# Patient Record
Sex: FEMALE | Race: WHITE | NOT HISPANIC OR LATINO
[De-identification: names, ages, dates, MRNs, and addresses within clinical notes are randomized per-mention and may not be internally consistent; named-entity substitution may affect disease eponyms.]

---

## 2021-03-27 ENCOUNTER — APPOINTMENT (OUTPATIENT)
Age: 36
End: 2021-03-27
Payer: COMMERCIAL

## 2021-03-27 PROCEDURE — 0001A: CPT

## 2021-04-15 PROBLEM — Z00.00 ENCOUNTER FOR PREVENTIVE HEALTH EXAMINATION: Status: ACTIVE | Noted: 2021-04-15

## 2021-04-19 ENCOUNTER — APPOINTMENT (OUTPATIENT)
Age: 36
End: 2021-04-19

## 2024-03-17 ENCOUNTER — TRANSCRIPTION ENCOUNTER (OUTPATIENT)
Age: 39
End: 2024-03-17

## 2024-03-18 ENCOUNTER — TRANSCRIPTION ENCOUNTER (OUTPATIENT)
Age: 39
End: 2024-03-18

## 2024-03-18 ENCOUNTER — EMERGENCY (EMERGENCY)
Facility: HOSPITAL | Age: 39
LOS: 1 days | Discharge: ANOTHER HOSPITAL | End: 2024-03-18
Attending: EMERGENCY MEDICINE | Admitting: EMERGENCY MEDICINE
Payer: COMMERCIAL

## 2024-03-18 ENCOUNTER — INPATIENT (INPATIENT)
Facility: HOSPITAL | Age: 39
LOS: 0 days | Discharge: HOME | DRG: 779 | End: 2024-03-18
Attending: PEDIATRICS | Admitting: PEDIATRICS
Payer: COMMERCIAL

## 2024-03-18 VITALS
OXYGEN SATURATION: 100 % | HEART RATE: 67 BPM | SYSTOLIC BLOOD PRESSURE: 89 MMHG | RESPIRATION RATE: 20 BRPM | DIASTOLIC BLOOD PRESSURE: 54 MMHG

## 2024-03-18 VITALS
HEART RATE: 64 BPM | SYSTOLIC BLOOD PRESSURE: 95 MMHG | RESPIRATION RATE: 16 BRPM | OXYGEN SATURATION: 99 % | DIASTOLIC BLOOD PRESSURE: 90 MMHG | TEMPERATURE: 96 F

## 2024-03-18 VITALS
TEMPERATURE: 97 F | HEIGHT: 69 IN | SYSTOLIC BLOOD PRESSURE: 90 MMHG | WEIGHT: 138.89 LBS | OXYGEN SATURATION: 99 % | RESPIRATION RATE: 16 BRPM | HEART RATE: 57 BPM | DIASTOLIC BLOOD PRESSURE: 57 MMHG

## 2024-03-18 VITALS
HEART RATE: 73 BPM | TEMPERATURE: 97 F | HEIGHT: 69 IN | DIASTOLIC BLOOD PRESSURE: 64 MMHG | SYSTOLIC BLOOD PRESSURE: 91 MMHG | WEIGHT: 139.99 LBS | OXYGEN SATURATION: 100 % | RESPIRATION RATE: 15 BRPM

## 2024-03-18 DIAGNOSIS — Z98.891 HISTORY OF UTERINE SCAR FROM PREVIOUS SURGERY: Chronic | ICD-10-CM

## 2024-03-18 LAB
ALBUMIN SERPL ELPH-MCNC: 3.2 G/DL — LOW (ref 3.4–5)
ALP SERPL-CCNC: 44 U/L — SIGNIFICANT CHANGE UP (ref 40–120)
ALT FLD-CCNC: 22 U/L — SIGNIFICANT CHANGE UP (ref 12–42)
ANION GAP SERPL CALC-SCNC: 10 MMOL/L — SIGNIFICANT CHANGE UP (ref 9–16)
APTT BLD: 25.2 SEC — SIGNIFICANT CHANGE UP (ref 24.5–35.6)
APTT BLD: 31.1 SEC — SIGNIFICANT CHANGE UP (ref 24.5–35.6)
AST SERPL-CCNC: 15 U/L — SIGNIFICANT CHANGE UP (ref 15–37)
BASOPHILS # BLD AUTO: 0.01 K/UL — SIGNIFICANT CHANGE UP (ref 0–0.2)
BASOPHILS # BLD AUTO: 0.02 K/UL — SIGNIFICANT CHANGE UP (ref 0–0.2)
BASOPHILS # BLD AUTO: 0.02 K/UL — SIGNIFICANT CHANGE UP (ref 0–0.2)
BASOPHILS NFR BLD AUTO: 0.2 % — SIGNIFICANT CHANGE UP (ref 0–2)
BASOPHILS NFR BLD AUTO: 0.3 % — SIGNIFICANT CHANGE UP (ref 0–2)
BASOPHILS NFR BLD AUTO: 0.3 % — SIGNIFICANT CHANGE UP (ref 0–2)
BILIRUB SERPL-MCNC: 0.1 MG/DL — LOW (ref 0.2–1.2)
BLD GP AB SCN SERPL QL: POSITIVE — SIGNIFICANT CHANGE UP
BUN SERPL-MCNC: 14 MG/DL — SIGNIFICANT CHANGE UP (ref 7–23)
CALCIUM SERPL-MCNC: 8.6 MG/DL — SIGNIFICANT CHANGE UP (ref 8.5–10.5)
CHLORIDE SERPL-SCNC: 106 MMOL/L — SIGNIFICANT CHANGE UP (ref 96–108)
CO2 SERPL-SCNC: 23 MMOL/L — SIGNIFICANT CHANGE UP (ref 22–31)
CREAT SERPL-MCNC: 0.64 MG/DL — SIGNIFICANT CHANGE UP (ref 0.5–1.3)
EGFR: 115 ML/MIN/1.73M2 — SIGNIFICANT CHANGE UP
EOSINOPHIL # BLD AUTO: 0.03 K/UL — SIGNIFICANT CHANGE UP (ref 0–0.5)
EOSINOPHIL # BLD AUTO: 0.05 K/UL — SIGNIFICANT CHANGE UP (ref 0–0.5)
EOSINOPHIL # BLD AUTO: 0.16 K/UL — SIGNIFICANT CHANGE UP (ref 0–0.5)
EOSINOPHIL NFR BLD AUTO: 0.6 % — SIGNIFICANT CHANGE UP (ref 0–6)
EOSINOPHIL NFR BLD AUTO: 0.6 % — SIGNIFICANT CHANGE UP (ref 0–6)
EOSINOPHIL NFR BLD AUTO: 2.8 % — SIGNIFICANT CHANGE UP (ref 0–6)
GLUCOSE BLDC GLUCOMTR-MCNC: 148 MG/DL — HIGH (ref 70–99)
GLUCOSE SERPL-MCNC: 112 MG/DL — HIGH (ref 70–99)
HCG SERPL-ACNC: 2703 MIU/ML — HIGH
HCT VFR BLD CALC: 25 % — LOW (ref 34.5–45)
HCT VFR BLD CALC: 28.8 % — LOW (ref 34.5–45)
HCT VFR BLD CALC: 33.8 % — LOW (ref 34.5–45)
HGB BLD-MCNC: 11.1 G/DL — LOW (ref 11.5–15.5)
HGB BLD-MCNC: 8.6 G/DL — LOW (ref 11.5–15.5)
HGB BLD-MCNC: 9.8 G/DL — LOW (ref 11.5–15.5)
IMM GRANULOCYTES NFR BLD AUTO: 0.3 % — SIGNIFICANT CHANGE UP (ref 0–0.9)
IMM GRANULOCYTES NFR BLD AUTO: 0.4 % — SIGNIFICANT CHANGE UP (ref 0–0.9)
IMM GRANULOCYTES NFR BLD AUTO: 0.7 % — SIGNIFICANT CHANGE UP (ref 0–0.9)
INR BLD: 1.17 — SIGNIFICANT CHANGE UP (ref 0.85–1.18)
INR BLD: 1.18 — SIGNIFICANT CHANGE UP (ref 0.85–1.18)
LYMPHOCYTES # BLD AUTO: 0.96 K/UL — LOW (ref 1–3.3)
LYMPHOCYTES # BLD AUTO: 1.15 K/UL — SIGNIFICANT CHANGE UP (ref 1–3.3)
LYMPHOCYTES # BLD AUTO: 1.39 K/UL — SIGNIFICANT CHANGE UP (ref 1–3.3)
LYMPHOCYTES # BLD AUTO: 12.3 % — LOW (ref 13–44)
LYMPHOCYTES # BLD AUTO: 21.2 % — SIGNIFICANT CHANGE UP (ref 13–44)
LYMPHOCYTES # BLD AUTO: 24.2 % — SIGNIFICANT CHANGE UP (ref 13–44)
MCHC RBC-ENTMCNC: 30.2 PG — SIGNIFICANT CHANGE UP (ref 27–34)
MCHC RBC-ENTMCNC: 31.1 PG — SIGNIFICANT CHANGE UP (ref 27–34)
MCHC RBC-ENTMCNC: 31.5 PG — SIGNIFICANT CHANGE UP (ref 27–34)
MCHC RBC-ENTMCNC: 32.8 GM/DL — SIGNIFICANT CHANGE UP (ref 32–36)
MCHC RBC-ENTMCNC: 34 GM/DL — SIGNIFICANT CHANGE UP (ref 32–36)
MCHC RBC-ENTMCNC: 34.4 GM/DL — SIGNIFICANT CHANGE UP (ref 32–36)
MCV RBC AUTO: 91.4 FL — SIGNIFICANT CHANGE UP (ref 80–100)
MCV RBC AUTO: 91.6 FL — SIGNIFICANT CHANGE UP (ref 80–100)
MCV RBC AUTO: 92.1 FL — SIGNIFICANT CHANGE UP (ref 80–100)
MONOCYTES # BLD AUTO: 0.21 K/UL — SIGNIFICANT CHANGE UP (ref 0–0.9)
MONOCYTES # BLD AUTO: 0.28 K/UL — SIGNIFICANT CHANGE UP (ref 0–0.9)
MONOCYTES # BLD AUTO: 0.3 K/UL — SIGNIFICANT CHANGE UP (ref 0–0.9)
MONOCYTES NFR BLD AUTO: 3.6 % — SIGNIFICANT CHANGE UP (ref 2–14)
MONOCYTES NFR BLD AUTO: 3.9 % — SIGNIFICANT CHANGE UP (ref 2–14)
MONOCYTES NFR BLD AUTO: 5.2 % — SIGNIFICANT CHANGE UP (ref 2–14)
NEUTROPHILS # BLD AUTO: 3.83 K/UL — SIGNIFICANT CHANGE UP (ref 1.8–7.4)
NEUTROPHILS # BLD AUTO: 4 K/UL — SIGNIFICANT CHANGE UP (ref 1.8–7.4)
NEUTROPHILS # BLD AUTO: 6.48 K/UL — SIGNIFICANT CHANGE UP (ref 1.8–7.4)
NEUTROPHILS NFR BLD AUTO: 66.8 % — SIGNIFICANT CHANGE UP (ref 43–77)
NEUTROPHILS NFR BLD AUTO: 73.7 % — SIGNIFICANT CHANGE UP (ref 43–77)
NEUTROPHILS NFR BLD AUTO: 82.9 % — HIGH (ref 43–77)
NRBC # BLD: 0 /100 WBCS — SIGNIFICANT CHANGE UP (ref 0–0)
PLATELET # BLD AUTO: 138 K/UL — LOW (ref 150–400)
PLATELET # BLD AUTO: 139 K/UL — LOW (ref 150–400)
PLATELET # BLD AUTO: 159 K/UL — SIGNIFICANT CHANGE UP (ref 150–400)
POTASSIUM SERPL-MCNC: 4.3 MMOL/L — SIGNIFICANT CHANGE UP (ref 3.5–5.3)
POTASSIUM SERPL-SCNC: 4.3 MMOL/L — SIGNIFICANT CHANGE UP (ref 3.5–5.3)
PROT SERPL-MCNC: 6.2 G/DL — LOW (ref 6.4–8.2)
PROTHROM AB SERPL-ACNC: 12.9 SEC — SIGNIFICANT CHANGE UP (ref 9.5–13)
PROTHROM AB SERPL-ACNC: 13.3 SEC — HIGH (ref 9.5–13)
RBC # BLD: 2.73 M/UL — LOW (ref 3.8–5.2)
RBC # BLD: 3.15 M/UL — LOW (ref 3.8–5.2)
RBC # BLD: 3.67 M/UL — LOW (ref 3.8–5.2)
RBC # FLD: 12.9 % — SIGNIFICANT CHANGE UP (ref 10.3–14.5)
RBC # FLD: 13.2 % — SIGNIFICANT CHANGE UP (ref 10.3–14.5)
RBC # FLD: 13.5 % — SIGNIFICANT CHANGE UP (ref 10.3–14.5)
RH IG SCN BLD-IMP: NEGATIVE — SIGNIFICANT CHANGE UP
RH IG SCN BLD-IMP: NEGATIVE — SIGNIFICANT CHANGE UP
SODIUM SERPL-SCNC: 139 MMOL/L — SIGNIFICANT CHANGE UP (ref 132–145)
TROPONIN I, HIGH SENSITIVITY RESULT: <4 NG/L — SIGNIFICANT CHANGE UP
WBC # BLD: 5.42 K/UL — SIGNIFICANT CHANGE UP (ref 3.8–10.5)
WBC # BLD: 5.74 K/UL — SIGNIFICANT CHANGE UP (ref 3.8–10.5)
WBC # BLD: 7.81 K/UL — SIGNIFICANT CHANGE UP (ref 3.8–10.5)
WBC # FLD AUTO: 5.42 K/UL — SIGNIFICANT CHANGE UP (ref 3.8–10.5)
WBC # FLD AUTO: 5.74 K/UL — SIGNIFICANT CHANGE UP (ref 3.8–10.5)
WBC # FLD AUTO: 7.81 K/UL — SIGNIFICANT CHANGE UP (ref 3.8–10.5)

## 2024-03-18 PROCEDURE — 99291 CRITICAL CARE FIRST HOUR: CPT

## 2024-03-18 PROCEDURE — 36430 TRANSFUSION BLD/BLD COMPNT: CPT

## 2024-03-18 PROCEDURE — 86850 RBC ANTIBODY SCREEN: CPT

## 2024-03-18 PROCEDURE — 85025 COMPLETE CBC W/AUTO DIFF WBC: CPT

## 2024-03-18 PROCEDURE — 86901 BLOOD TYPING SEROLOGIC RH(D): CPT

## 2024-03-18 PROCEDURE — 99285 EMERGENCY DEPT VISIT HI MDM: CPT

## 2024-03-18 PROCEDURE — 85730 THROMBOPLASTIN TIME PARTIAL: CPT

## 2024-03-18 PROCEDURE — 86920 COMPATIBILITY TEST SPIN: CPT

## 2024-03-18 PROCEDURE — 85610 PROTHROMBIN TIME: CPT

## 2024-03-18 PROCEDURE — 86870 RBC ANTIBODY IDENTIFICATION: CPT

## 2024-03-18 PROCEDURE — 88305 TISSUE EXAM BY PATHOLOGIST: CPT | Mod: 26

## 2024-03-18 PROCEDURE — 86922 COMPATIBILITY TEST ANTIGLOB: CPT

## 2024-03-18 PROCEDURE — 86880 COOMBS TEST DIRECT: CPT

## 2024-03-18 PROCEDURE — 86077 PHYS BLOOD BANK SERV XMATCH: CPT

## 2024-03-18 PROCEDURE — 86900 BLOOD TYPING SEROLOGIC ABO: CPT

## 2024-03-18 PROCEDURE — C9399: CPT

## 2024-03-18 PROCEDURE — P9016: CPT

## 2024-03-18 PROCEDURE — 36415 COLL VENOUS BLD VENIPUNCTURE: CPT

## 2024-03-18 PROCEDURE — 88305 TISSUE EXAM BY PATHOLOGIST: CPT

## 2024-03-18 RX ORDER — HYDROMORPHONE HYDROCHLORIDE 2 MG/ML
0.5 INJECTION INTRAMUSCULAR; INTRAVENOUS; SUBCUTANEOUS
Refills: 0 | Status: DISCONTINUED | OUTPATIENT
Start: 2024-03-18 | End: 2024-03-18

## 2024-03-18 RX ORDER — SODIUM CHLORIDE 9 MG/ML
1000 INJECTION, SOLUTION INTRAVENOUS
Refills: 0 | Status: DISCONTINUED | OUTPATIENT
Start: 2024-03-18 | End: 2024-03-18

## 2024-03-18 RX ORDER — METOCLOPRAMIDE HCL 10 MG
10 TABLET ORAL EVERY 6 HOURS
Refills: 0 | Status: DISCONTINUED | OUTPATIENT
Start: 2024-03-18 | End: 2024-03-18

## 2024-03-18 RX ORDER — SODIUM CHLORIDE 9 MG/ML
1000 INJECTION INTRAMUSCULAR; INTRAVENOUS; SUBCUTANEOUS ONCE
Refills: 0 | Status: COMPLETED | OUTPATIENT
Start: 2024-03-18 | End: 2024-03-18

## 2024-03-18 RX ORDER — ONDANSETRON 8 MG/1
8 TABLET, FILM COATED ORAL EVERY 6 HOURS
Refills: 0 | Status: DISCONTINUED | OUTPATIENT
Start: 2024-03-18 | End: 2024-03-18

## 2024-03-18 RX ORDER — KETOROLAC TROMETHAMINE 30 MG/ML
30 SYRINGE (ML) INJECTION EVERY 6 HOURS
Refills: 0 | Status: DISCONTINUED | OUTPATIENT
Start: 2024-03-18 | End: 2024-03-18

## 2024-03-18 RX ORDER — ACETAMINOPHEN 500 MG
1000 TABLET ORAL EVERY 6 HOURS
Refills: 0 | Status: DISCONTINUED | OUTPATIENT
Start: 2024-03-18 | End: 2024-03-18

## 2024-03-18 RX ORDER — SIMETHICONE 80 MG/1
80 TABLET, CHEWABLE ORAL EVERY 6 HOURS
Refills: 0 | Status: DISCONTINUED | OUTPATIENT
Start: 2024-03-18 | End: 2024-03-18

## 2024-03-18 RX ORDER — OXYCODONE HYDROCHLORIDE 5 MG/1
10 TABLET ORAL EVERY 4 HOURS
Refills: 0 | Status: DISCONTINUED | OUTPATIENT
Start: 2024-03-18 | End: 2024-03-18

## 2024-03-18 RX ORDER — OXYCODONE HYDROCHLORIDE 5 MG/1
5 TABLET ORAL EVERY 4 HOURS
Refills: 0 | Status: DISCONTINUED | OUTPATIENT
Start: 2024-03-18 | End: 2024-03-18

## 2024-03-18 RX ADMIN — SODIUM CHLORIDE 1000 MILLILITER(S): 9 INJECTION INTRAMUSCULAR; INTRAVENOUS; SUBCUTANEOUS at 11:05

## 2024-03-18 RX ADMIN — SODIUM CHLORIDE 1000 MILLILITER(S): 9 INJECTION INTRAMUSCULAR; INTRAVENOUS; SUBCUTANEOUS at 12:46

## 2024-03-18 RX ADMIN — SODIUM CHLORIDE 1000 MILLILITER(S): 9 INJECTION INTRAMUSCULAR; INTRAVENOUS; SUBCUTANEOUS at 12:34

## 2024-03-18 NOTE — DISCHARGE NOTE PROVIDER - CARE PROVIDERS DIRECT ADDRESSES
[email or web address].Counts include 234 beds at the [facility]

## 2024-03-18 NOTE — ASU DISCHARGE PLAN (ADULT/PEDIATRIC) - CARE PROVIDER_API CALL
Ashwin Lynn  Obstetrics and Gynecology  [address] [address]  Phone: [phone]  Fax: [phone]  Follow Up Time:

## 2024-03-18 NOTE — ED ADULT NURSE NOTE - NSFALLUNIVINTERV_ED_ALL_ED
Bed/Stretcher in lowest position, wheels locked, appropriate side rails in place/Call bell, personal items and telephone in reach/Instruct patient to call for assistance before getting out of bed/chair/stretcher/Non-slip footwear applied when patient is off stretcher/[place] to call system/Physically safe environment - no spills, clutter or unnecessary equipment/Purposeful proactive rounding/Room/bathroom lighting operational, light cord in reach

## 2024-03-18 NOTE — ED ADULT NURSE NOTE - OBJECTIVE STATEMENT
Pt is a 40 yo F bibems transferred from [facility] for vaginal bleeding after taking misoprostol at approx 930pm last night for nonviable IUP (approx ?8 weeks). Pt reports bleeding starting around 5am, now soaking thru a pad every 5 min. Given one unit prbcs at [facility], unit completed upon arrival here. Pt upgraded to MD Penny, with OBNORTH PA both at bedside upon room assignment.  On arrival, BP 80-90s systolic; states her bp is usually low. PIVs noted to bilateral ACs with 1L NS infusing; additional bloodwork sent to lab. Pt transferred to room L for pelvic exam with OB attending who recommends transfer to OR.   Pt able to ambulate with standby assist from stretcher to bed but c/o developing nausea and lightheadedness.   Pt back in stretcher without issue, on continuous cardiac/O2/BP monitoring.   RN report given to OR; pt transported by OB PA x2 and attending off unit without issue. Pt's  at bedside with all of pt's belongings.

## 2024-03-18 NOTE — ED ADULT NURSE NOTE - CHIEF COMPLAINT QUOTE
Pt, (G4,P2) presents to ER as a transfer from [place] for further evaluation of vaginal bleeding since this morning s/p taking misoprostol yesterday.

## 2024-03-18 NOTE — BRIEF OPERATIVE NOTE - NSICDXBRIEFPROCEDURE_GEN_ALL_CORE_FT
PROCEDURES:  Suction curettage or dilation and curettage, uterus, for termination of pregnancy 18-Mar-2024 15:23:06  Simran Lee

## 2024-03-18 NOTE — ED PROVIDER NOTE - ATTENDING APP SHARED VISIT CONTRIBUTION OF CARE
Patient with heavy vaginal bleeding after taking misoprostol. Had a syncopal episode at home and again in the ED. No injury in ED.    Gen: NAD. HEENT: NCAT, mmm   Chest: RRR, nl S1 and S2, no m/r/g. Resp: CTAB, no w/r/r  Abd: nl BS, soft, nt/nd. : active vaginal bleeding. Ext: Warm, dry  Neuro: CN II-XII intact, normal and equal strength, sensation, and reflexes bilaterally, speech clear.  Psych: AAOx3     MDM: Patient urgently transferred to [facility] for heavy vaginal bleeding.    Critical Care time: 60 minutes

## 2024-03-18 NOTE — BRIEF OPERATIVE NOTE - OPERATION/FINDINGS
cervix grasped with single tooth tenaculum. 8mm curette inserted and serially rotated until contents of uterus fully cleared. Banjo curette used. EBL 50. 200cc clot passed prior to starting procedure.

## 2024-03-18 NOTE — ED PROVIDER NOTE - PROGRESS NOTE DETAILS
Pt had another syncopal episode in the bathroom  Still going through 2 pads every 30 min  Spoke with Dr. Hernandez OBGYN, who accepts pt as and ED to ED transfer. Pt will require one unit of blood prior to transfer  Spoke with Dr. Singer, ED attending, accepts pt to the ED.

## 2024-03-18 NOTE — DISCHARGE NOTE PROVIDER - NSDCFUADDINST_GEN_ALL_CORE_FT
Pelvic rest (nothing in vagina) x6 weeks or unless otherwise instructed by physician. Schedule follow up appointment with Dr. Lynn or outside OBGYN        in 1-2 weeks. Go to nearest ED if fever >100.4 F, severe abdominal pain or heavy vaginal bleeding.   Wound care: Showering is allowed, no baths. Do not scrub incision area. Pat and dry all areas where incisions are.   Take Motrin 600mg every 6 hours or Tylenol 1000mg every 8 hours as needed for pain

## 2024-03-18 NOTE — DISCHARGE NOTE PROVIDER - HOSPITAL COURSE
38yo  at 8w0d by LMP  presents to the ED from [facility] ER complaining of heavy vaginal bleeding after taking cytotec last night 800mcg PV for MAB. On Friday, she received Rhogam. This morning when she went to stand up she fainted unwitnessed and hit the right side of her head. Pt states in the Er there she fainted again and received a unit of blood. Bleeding 1 pad every 5-30 minutes.  Patient also states she has felt lightheaded, dizzy and nauseous throughout the day. S/p Suction D&C.  Patient doing well post suction D&C,hemodyanmcially stable, Hgb stable, tolerating diet, ambulating and voiding. Patient given strict return precautions.

## 2024-03-18 NOTE — ED PROVIDER NOTE - PHYSICAL EXAMINATION
General: well developed, well nourished, no distress  Eye: bilateral: PERRL, EOMI  Ears, Nose, Throat: normal pharynx, TMs normal, membranes moist.  Neck: non-tender, full range of motion, supple.  Negative For: lymphadenopathy (R), lymphadenopathy (L)  Respiratory: CTAB.  Cardiovascular: S1-S2 normal, regular rate, regular rhythm.  Abdomen: normal bowel sounds, non tender, soft.    Genitourinary: Negative For: CVA tenderness  Pelvic exam:  Chaperone (COURTNEY Soler)  Large clot and bleeding appreciated, oz not visualized due to clotting  Musculoskeletal: normal gait.  Negative For: back pain, upper, back pain  Extremities: normal range of motion, non-tender.  Negative For: edema (R), edema (L), calf tenderness (R), calf tenderness (L), swelling  Extremity Strength: upper extremities equal bilateral: 5/5, lower extremities equal bilateral: 5/5  Neurologic: alert, oriented to person, oriented to place, oriented to time.    Skin: normal color.  Negative For: rash  Psychiatric: normal affect, normal insight, normal concentration

## 2024-03-18 NOTE — H&P ADULT - HISTORY OF PRESENT ILLNESS
40yo  at 8w0d by LMP  presents to the ED from [facility] ER complaining of heavy vaginal bleeding after taking cytotec last night. Pt states that she went to her outside OBGYN on Friday after noticing some spotting on  her underwear.  Pt states on Friday she was diagnosed with a failed IUP and received Rhogam Pt then took Cytotec 800mcg vaginally last night at 0, pt woke up this morning with heavy VB.  This morning when she went to stand up she fainted unwitnessed and hit the right side of her head.  Patient called for her  who rushed her to the ER at [place]  Pt states in the Er there she fainted again and received a unit of blood.  Pt states throughout the entire day she has been bleeding 1 pad every 5-30 minutes.  Patient also states she has felt lightheaded, dizzy and nauseous throughout the day.  Patient states she has gushes of clots coming out right now but no longer feels as dizzy. Pt denies fever, chills, chest pain, SOB, abdominal pain, nausea, vomiting, vaginal bleeding      OB/GYN Hx: G1: 2019 c/s G2:   G3: MAB tx cytotec 10/23 G4: MAB tx cytotec   Last PAP smear: wnl    PMHx: denies  SHx:  c/s   Meds: s/p cytotec 800mcg 2130 3/17  Allergies: NKDA    Social hx:  at bedside    PHYSICAL EXAM:   Vital Signs Last 24 Hrs  T(C): 36.3 (18 Mar 2024 13:31), Max: 36.4 (18 Mar 2024 11:29)  T(F): 97.3 (18 Mar 2024 13:31), Max: 97.6 (18 Mar 2024 11:29)  HR: 73 (18 Mar 2024 14:27) (57 - 73)  BP: 104/56 (18 Mar 2024 14:27) (85/90 - 104/56)  RR: 18 (18 Mar 2024 14:27) (15 - 18)  SpO2: 100% (18 Mar 2024 14:27) (99% - 100%)    Parameters below as of 18 Mar 2024 14:27  Patient On (Oxygen Delivery Method): room air        **************************

## 2024-03-18 NOTE — ASU DISCHARGE PLAN (ADULT/PEDIATRIC) - NS MD DC FALL RISK RISK
For information on Fall & Injury Prevention, visit: [email or web address] OR  [email or web address] OR  https://www.cdc.gov/steadi/patient.html

## 2024-03-18 NOTE — DISCHARGE NOTE PROVIDER - CARE PROVIDER_API CALL
Ashwin Lynn  Obstetrics and Gynecology  [address] [address]  Phone: [phone]  Fax: [phone]  Follow Up Time: 2 weeks

## 2024-03-18 NOTE — DISCHARGE NOTE PROVIDER - NSDCCPCAREPLAN_GEN_ALL_CORE_FT
PRINCIPAL DISCHARGE DIAGNOSIS  Diagnosis: Vaginal bleeding  Assessment and Plan of Treatment:       SECONDARY DISCHARGE DIAGNOSES  Diagnosis: Incomplete   Assessment and Plan of Treatment:

## 2024-03-18 NOTE — ED ADULT NURSE NOTE - OBJECTIVE STATEMENT
40 y/o female with c/o vaginal bleeding s/p taking a vaginal suppository to induce spontaneous  due to un viable  pregnancy at 8 weeks- pt reports having a syncopal episode with heavy vaginal bleeding- pt denies any cramping- pt presents with redness, and slight hematoma to right side of forehead. pt refuses CT at this time- comfort measures given call bell within reach

## 2024-03-18 NOTE — DISCHARGE NOTE PROVIDER - NSDCCPTREATMENT_GEN_ALL_CORE_FT
PRINCIPAL PROCEDURE  Procedure: Suction curettage or dilation and curettage, uterus, for termination of pregnancy  Findings and Treatment:

## 2024-03-18 NOTE — H&P ADULT - NSHPPHYSICALEXAM_GEN_ALL_CORE
Constitutional: Alert & Oriented x3, No acute distress  Skin: bruise on Right side of face from fall this morning   Gastrointestinal: soft, non tender , no rebound or guarding   Pelvic exam: actively hemorrhaging, blood pouring out when speculum placed, unable to remove POC   Extremities: no calf tenderness or swelling

## 2024-03-18 NOTE — PRE-ANESTHESIA EVALUATION ADULT - NSANTHPMHFT_GEN_ALL_CORE
Patient with two episodes of LOC today at [facility] for incomplete , 8 weeks.    PsxH denies      Per surgeon she received 1 unit of PRBCs today and her hb is now 11 but she is actively bleeding from her vagina

## 2024-03-18 NOTE — ED PROVIDER NOTE - CRITICAL CARE ATTENDING CONTRIBUTION TO CARE
Upon my evaluation, this patient had a high probability of imminent or life-threatening deterioration due to   active hemorrhage after medical mgmt of missed ab  which required my direct attention, intervention, and personal management.    I have personally provided the above minutes of critical care time exclusive of time spent on separately billable procedures. Time includes review of laboratory data, radiology results, discussion with consultants, and monitoring for potential decompensation. Interventions were performed as documented above.

## 2024-03-18 NOTE — ED PROVIDER NOTE - CLINICAL SUMMARY MEDICAL DECISION MAKING FREE TEXT BOX
39F  (recently dx with missed ab at 8 weeks pregnancy) who present to [facility] for heavy vaginal bleeding and syncope. pt was dx with missed Ab by her OB on Friday, she was prescribed Misoprostal which she took last night. She woke up today with heavy bleeding, while sitting on the toilet she fainted and hit the right side of her head on the floor, she was transferred to [facility] where she fainted again, she was given 1U PRBCs and transferred to [facility] for urgent gyn consultation. Pt reports she is A- and that she received a mini dose of Rhogam Friday at her OB's office. She states she is still bleeding, denies cp/sob, does not feel dizzy or LH currently. 39F  (recently dx with missed ab at 8 weeks pregnancy) who present to [facility] for heavy vaginal bleeding and syncope. pt was dx with missed Ab by her OB on Friday, she was prescribed Misoprostal which she took last night. She woke up today with heavy bleeding, while sitting on the toilet she fainted and hit the right side of her head on the floor, she was transferred to [facility] where she fainted again, she was given 1U PRBCs and transferred to [facility] for urgent gyn consultation. Pt reports she is A- and that she received a mini dose of Rhogam Friday at her OB's office. She states she is still bleeding, denies cp/sob, does not feel dizzy or LH currently.  2 IVs in place. Repeat CBC and T&S/coags ordered and sent.   BP 90/57 on arrival, afebrile, no tachycardia, though pt appears pale.  Gyn at bedside for exam including attg Dr. Lynn. Per gyn given pt actively hemorrhaging after medically treating a missed  she will be class 1 to OR to stop bleeding. Hgb 11.1>(1U PRBC)> 9.8, 2 more U PRBCs ordered,  Antibody screening positive, Rhogam was given on Friday 3/15.  Dr. Lynn and ER RN transported pt to OR for further mgmt of hemorrhage.

## 2024-03-18 NOTE — ED ADULT NURSE NOTE - NSFALLHARMRISKINTERV_ED_ALL_ED

## 2024-03-18 NOTE — H&P ADULT - NSHPLABSRESULTS_GEN_ALL_CORE
LABS:                        9.8    7.81  )-----------( 138      ( 18 Mar 2024 13:49 )             28.8     03-18    139  |  106  |  14  ----------------------------<  112<H>  4.3   |  23  |  0.64    Ca    8.6      18 Mar 2024 10:49    TPro  6.2<L>  /  Alb  3.2<L>  /  TBili  0.1<L>  /  DBili  x   /  AST  15  /  ALT  22  /  AlkPhos  44  03-18    PT/INR - ( 18 Mar 2024 10:49 )   PT: 12.9 sec;   INR: 1.18          PTT - ( 18 Mar 2024 10:49 )  PTT:31.1 sec  Urinalysis Basic - ( 18 Mar 2024 10:49 )    Color: x / Appearance: x / SG: x / pH: x  Gluc: 112 mg/dL / Ketone: x  / Bili: x / Urobili: x   Blood: x / Protein: x / Nitrite: x   Leuk Esterase: x / RBC: x / WBC x   Sq Epi: x / Non Sq Epi: x / Bacteria: x      HCG Quantitative, Serum: 2703 mIU/mL (03-18 @ 10:49)      RADIOLOGY & ADDITIONAL STUDIES: REFUSED HEAD CT AT [employer]

## 2024-03-18 NOTE — PROGRESS NOTE ADULT - ASSESSMENT
40 yo s/p suction D+C for heavy vaginal bleeding iso incomplete ab.  - Pt doing well in PACU  - Plan for CBC at 1930 and discharge to home if stable  - Pt without symptoms of anemia at this time and no evidence of ongoing bleeding    Juan PGY2

## 2024-03-18 NOTE — ASU DISCHARGE PLAN (ADULT/PEDIATRIC) - ASU DC SPECIAL INSTRUCTIONSFT
- Nothing in vagina - no intercourse, tampons, or douching until cleared by your doctor.   - Avoid swimming, tub baths, and heavy lifting until cleared by your doctor.   - Showering is ok.   - Continue oral pain medications as needed for pain. Can take tylenol 1000mg every 6 hours as needed in addition to ibuprofen 600 mg every 6 hours as needed. Alternate tylenol and ibuprofen every three hours for optimal pain control.   - Constipation may be expected. Okay to take stool softener if desired (example: colace). If no bowel movements after 4-5 days, please take stool softener.   - Follow up in office in 1-2 weeks for your postoperative visit.    - Call the office sooner if you develop any fever, heavy bleeding, or severe pain.  Go to the closest emergency room for any of these symptoms if you are not able to contact your doctor.

## 2024-03-18 NOTE — ED PROVIDER NOTE - PHYSICAL EXAMINATION
GEN: Well appearing, well developed, awake, alert, oriented to person, place, time/situation and in no apparent distress. NTAF  ENT: Airway patent, Nasal mucosa clear. Mouth with normal mucosa.  EYES: Clear bilaterally. PERRL, EOMI  RESPIRATORY: Breathing comfortably with normal RR. No W/C/R, no hypoxia or resp distress.  CARDIAC: Regular rate and rhythm, no M/R/G  ABDOMEN: Soft, nontender, +bowel sounds, no rebound, rigidity, or guarding.  : gyn at bedside, exam deferred to gyn.   MSK: Range of motion is not limited, no deformities noted.  NEURO: Alert and oriented, no focal deficits.  SKIN: Skin color pale for race, warm, dry and intact. No evidence of rash.  PSYCH: Alert and oriented to person, place, time/situation. normal mood and affect. no apparent risk to self or others.

## 2024-03-18 NOTE — ED ADULT TRIAGE NOTE - CHIEF COMPLAINT QUOTE
Pt reports she had a nonviable pregnancy, took miscarriage medication vaginally last night. Pt started with extremely heavy bleeding, changing pad every few min and passing large clots. This AM pt also passed out and struck head. Pt remains dizzy and lightheaded. No n/v. No AC use. .

## 2024-03-18 NOTE — ASU DISCHARGE PLAN (ADULT/PEDIATRIC) - CALL YOUR DOCTOR IF YOU HAVE ANY OF THE FOLLOWING:
Fever greater than (need to indicate Fahrenheit or Celsius)/Wound/Surgical Site with redness, or foul smelling discharge or pus/Nausea and vomiting that does not stop/Unable to urinate

## 2024-03-18 NOTE — ED ADULT TRIAGE NOTE - BP NONINVASIVE SYSTOLIC (MM HG)
Increase exercise as planned.  Restart vit D one daily x 1 week. Then one weekly.  See eye doctor.  Continue diabetes & chol meds.  Call if blood sugars are running under 100 or over 200.  F/u in 6 months, with labs prior.       
90

## 2024-03-18 NOTE — ED ADULT TRIAGE NOTE - ESI TRIAGE ACUITY LEVEL, MLM
----- Message from Milton Cho MD sent at 8/19/2022  2:13 PM CDT -----  Immune to MMR   Immune to chicken pox    Not immune to hep b   Needs to start hep b series    3

## 2024-03-18 NOTE — ED PROVIDER NOTE - CLINICAL SUMMARY MEDICAL DECISION MAKING FREE TEXT BOX
40 yo F presents to this ED for vaginal bleeding in setting of Misoprostal use due to failed IUP  On arrival BP is 90/50 (states that this is normal for her)  Labs, trop, hcg, T+S, coag studies ordered  U/S ordered  CT head ordered as she did have headstrike, however pt refused  Will continue to monitor pt

## 2024-03-18 NOTE — ED PROVIDER NOTE - OBJECTIVE STATEMENT
40 yo F, no pmh,  (1 miscarriage, currently 8 weeks), presents to this ED for vaginal bleeding after receiving Misoprostal yesterday. States that she went to her OB on Friday, was diagnosed with a failed IUP, and Misoprostal was sent to her pharmacy. Took it yesterday, and woke up to bleeding. States that she is soaking through one pad every 30 minutes. Was on a work call when she syncopized, fell to the ground. Her  ran in and rushed her to the ED. Currently pt denies any symptoms besides the bleeding. Pt states that she received a mini dose of Rhogam Friday at her OB's office.

## 2024-03-18 NOTE — ASU DISCHARGE PLAN (ADULT/PEDIATRIC) - ACTIVITY LEVEL
2 No heavy lifting/Nothing per rectum/Nothing per vagina/No tub baths/No douching/No tampons/No intercourse

## 2024-03-18 NOTE — ED PROVIDER NOTE - OBJECTIVE STATEMENT
39F  (recently dx with missed ab at 8 weeks pregnancy) who present to [facility] for heavy vaginal bleeding and syncope. pt was dx with missed Ab by her OB on Friday, she was prescribed Misoprostal which she took last night. She woke up today with heavy bleeding, while sitting on the toilet she fainted and hit the right side of her head on the floor, she was transferred to [facility] where she fainted again, she was given 1U PRBCs and transferred to [facility] for urgent gyn consultation. Pt reports she is A- and that she received a mini dose of Rhogam Friday at her OB's office. She states she is still bleeding, denies cp/sob, does not feel dizzy or LH currently. 39F  (recently dx with missed ab at 8 weeks pregnancy) who present to [facility] for heavy vaginal bleeding and syncope. pt was dx with missed Ab by her OB on Friday, she was prescribed Misoprostol which she took last night. She woke up today with heavy bleeding, while sitting on the toilet she fainted and hit the right side of her head on the floor, she was transferred to [facility] where she fainted again, she was given 1U PRBCs and transferred to [facility] for urgent gyn consultation. Pt reports she is A- and that she received a mini dose of Rhogam Friday at her OB's office. She states she is still bleeding, denies cp/sob, does not feel dizzy or LH currently.

## 2024-03-18 NOTE — H&P ADULT - ASSESSMENT
38yo  at 8w0d by LMP  presents actively hemorrhaging after medically treating a missed .   -Upon seeing patient is pale and hypotensive 80s/50s   -Hgb 11.1>(1U PRBC)> 9.8> ORDERED TWO MORE UNITS,  Antibody screening positive, Rhogam was given on Friday 3/15  -T&S A negative   -Unable to remove products via speculum in ER with Dr Lynn   -Consents obtained with Dr Lynn for class 1 suction D&C and any other indicated procedures   -Will monitor on tele throughout   -Dr Lynn in agreement w/  plan

## 2024-03-21 DIAGNOSIS — O20.9 HEMORRHAGE IN EARLY PREGNANCY, UNSPECIFIED: ICD-10-CM

## 2024-03-21 DIAGNOSIS — Z3A.08 8 WEEKS GESTATION OF PREGNANCY: ICD-10-CM

## 2024-03-22 LAB — SURGICAL PATHOLOGY STUDY: SIGNIFICANT CHANGE UP

## 2024-03-25 DIAGNOSIS — R55 SYNCOPE AND COLLAPSE: ICD-10-CM

## 2024-03-25 DIAGNOSIS — O03.4 INCOMPLETE SPONTANEOUS ABORTION WITHOUT COMPLICATION: ICD-10-CM

## 2024-06-18 NOTE — ED ADULT NURSE NOTE - CAS TRG GEN SKIN CONDITION
Emergency Department SIGN OUT NOTE     Patient: Radha Clifford Age: 28 year old Sex: female   MRN: [id number] : 1995 Encounter Date: 2024     Received Sign-Out From: Dr. Dempsey  Dispo: Pending CT A/P    History & Course: 28 year old is a female presenting for sore throat and vaginal bleeding. Found to have ongoing miscarriage. Has leukocytosis. On reevaluation, reporting RLQ abdominal pain.   Pending items: CT A/P, reeval for dispo    ED Course as of 24 0957   Mon 2024   2320 Pt with rlq tenderness to palpation, neg strep and covid, will r/o appy with CT, I doubt US will be helpful secondary to body habitus.   [NM]      0146 CT scan of the abdomen and pelvis with intravenous contrast (axial sections with sagittal and coronal reformats) 2024 at 0022 hours    Clinical History: Right lower quadrant pain, rule out appy - patient has a nonviable pregnancy- impending .  No prior study is available for comparison.  Findings:  The lung bases are clear.  Fatty infiltration of the liver is noted. The gallbladder, pancreas, spleen, kidneys and adrenals are unremarkable.  No evidence of bowel dilatation. The appendix is within normal limits.  The urinary bladder is unremarkable. Small left ovarian follicle is noted. There is no free fluid or free air. There is no adenopathy. A small fat-containing umbilical hernia is present.  The osseous structures are unremarkable.    Impression:  No evidence of appendicitis or other acute intra-abdominal/pelvic pathology. [SM]   0151 On reevaluation patient is well appearing in no acute distress. Patient is afebrile. She reports that throat pain and abdominal pain have both resolved. Plan for discharge home with close outpatient f/u with PCP and OB/gyn. Patient updated with all results and in agreement with plan. All questions answered. Return precautions discussed at length.  [SM]      ED Course User Index  [NM] Francisca Dempsey,  MD  [] Estella Barreto MD       The patient's evaluation and treatment has been initiated or completed by the previous team. Please refer to their documentation for further details regarding their care.     Vitals with min/max:    Vital Last Value 24 Hour Range   Temperature 98.1 °F (36.7 °C) (06/18/24 0150) No data recorded   Pulse 95 (06/17/24 2228) No data recorded   Respiratory 16 (06/17/24 2146) No data recorded   Non-Invasive  Blood Pressure 120/64 (06/17/24 2228) No data recorded   Pulse Oximetry 98 % (06/17/24 2228) No data recorded   Arterial   Blood Pressure   No data recorded     Labs:   Results for orders placed or performed during the hospital encounter of 06/17/24   CBC with Automated Differential (performable only)   Result Value Ref Range    WBC 15.0 (H) 4.2 - 11.0 K/mcL    RBC 4.45 4.00 - 5.20 mil/mcL    HGB 11.1 (L) 12.0 - 15.5 g/dL    HCT 35.2 (L) 36.0 - 46.5 %    MCV 79.1 78.0 - 100.0 fl    MCH 24.9 (L) 26.0 - 34.0 pg    MCHC 31.5 (L) 32.0 - 36.5 g/dL    RDW-CV 15.8 (H) 11.0 - 15.0 %    RDW-SD 44.9 39.0 - 50.0 fL     140 - 450 K/mcL    NRBC 0 <=0 /100 WBC    Neutrophil, Percent 83 %    Lymphocytes, Percent 11 %    Mono, Percent 6 %    Eosinophils, Percent 0 %    Basophils, Percent 0 %    Immature Granulocytes 0 %    Absolute Neutrophils 12.4 (H) 1.8 - 7.7 K/mcL    Absolute Lymphocytes 1.6 1.0 - 4.8 K/mcL    Absolute Monocytes 0.9 0.3 - 0.9 K/mcL    Absolute Eosinophils  0.1 0.0 - 0.5 K/mcL    Absolute Basophils 0.1 0.0 - 0.3 K/mcL    Absolute Immature Granulocytes 0.1 0.0 - 0.2 K/mcL   Beta HCG Quantitative Pregnancy   Result Value Ref Range    HCG, Quantitative 362 (H) <=4 mUnits/mL   COVID/Flu/RSV panel   Result Value Ref Range    Rapid SARS-COV-2 by PCR Not Detected Not Detected / Detected / Presumptive Positive / Inhibitors present    Influenza A by PCR Not Detected Not Detected    Influenza B by PCR Not Detected Not Detected    RSV BY PCR Not Detected Not Detected    Isolation  Guidelines      Procedural Comment     ABO/RH GROUP AND TYPE (D)   Result Value Ref Range    ABO/RH(D) A Rh Positive    Streptococcus group A PCR    Specimen: Throat; Swab   Result Value Ref Range    STREPTOCOCCUS GROUP A PCR Not Detected Not Detected   HCG POC   Result Value Ref Range    HCG, URINE - POINT OF CARE Positive (A) Negative   URINALYSIS, MACROSCOPIC -POINT OF CARE   Result Value Ref Range    COLOR - POINT OF CARE Yellow     APPEARANCE, URINALYSIS - POINT OF CARE Clear     GLUCOSE, URINALYSIS - POINT OF CARE Negative Negative mg/dL    BILIRUBIN, URINALYSIS - POINT OF CARE Negative Negative    KETONES, URINALYSIS - POINT OF CARE Trace (A) Negative mg/dL    SPECIFIC GRAVITY, URINALYSIS - POINT OF CARE 1.020 1.005 - 1.030 NULL    OCCULT BLOOD, URINALYSIS - POINT OF CARE Small (A) Negative    PH, URINALYSIS - POINT OF CARE 6.0 5.0 - 7.0 Units    PROTEIN, URINALYSIS - POINT OF CARE Negative Negative mg/dL    UROBILINOGEN, URINALYSIS - POINT OF CARE 0.2 0.2, 1.0 mg/dL    NITRITE, URINALYSIS - POINT OF CARE Negative Negative    WBC ESTERASE, URINALYSIS - POINT OF CARE Negative Negative     Imaging:   CT ABDOMEN PELVIS W CONTRAST - IV contrast only   Final Result   1.   Normal CT appearance of the appendix. No evidence of bowel   obstruction.   2.   Mild hepatomegaly with probable fatty liver.      A preliminary report was provided by the on-call teleradiology service.      FOR PHYSICIAN USE ONLY - Please note that this report was generated using   voice recognition software.  If you require clarification or feel that   there has been an error in this report please contact me through   [employer].  Thank you very much for allowing me to participate in the   care of your patient.          Electronically Signed by: CHANELL VENTURA M.D.    Signed on: 6/18/2024 8:00 AM    Workstation ID: [id number]      US OB LESS THAN 14 WEEKS AND TRANSVAGINAL 1 FETUS   Final Result        EKG:   Encounter Date: 02/23/24   ECG    Result Value    Ventricular Rate EKG/Min (BPM) 60    Atrial Rate (BPM) 60    CO-Interval (MSEC) 164    QRS-Interval (MSEC) 96    QT-Interval (MSEC) 416    QTc 416    P Axis (Degrees) 38    R Axis (Degrees) 45    T Axis (Degrees) 17    REPORT TEXT      Normal sinus rhythm  with sinus arrhythmia  Cannot rule out  Anterior infarct  , age undetermined  Abnormal ECG  No previous ECGs available  Confirmed by ERNA SPEAR MD ([id number]) on 2/24/2024 3:51:46 PM          Medications received in the department:   ED Medication Orders (From admission, onward)      Ordered Start     Status Ordering Provider    06/17/24 2216 06/17/24 2217  acetaminophen (TYLENOL) tablet 1,000 mg  ONCE         Last MAR action: MANUEL Be participated in the care of this patient and this note provides additional information regarding their visit. Please refer to attending note for further details regarding history, physical exam, workup, medical decision making, and disposition. This note may have been created using voice dictation technology and may include inadvertent errors.       Estella Barreto MD  6/19/2024 11:32 PM         Estella Barreto MD  06/19/24 0957     Warm

## 2025-03-12 NOTE — H&P ADULT - REASON FOR ADMISSION
Called to notify patient for medicare wellness visit, left detailed message and phone number    active hemorrhage in [facility]

## (undated) DEVICE — GLV 8 PROTEXIS (WHITE)

## (undated) DEVICE — WARMING BLANKET UPPER ADULT

## (undated) DEVICE — VACUUM CURETTE MEDGYN CURVED 13MM

## (undated) DEVICE — VACUUM CURETTE BERKLEY OLYMPUS CURVED 12MM

## (undated) DEVICE — VACUUM CURETTE BERKLEY OLYMPUS CURVED 7MM

## (undated) DEVICE — TUBING SUCTION CONNECTOR UTERINE ASPIRATION UVAC 0.5" X 6FT

## (undated) DEVICE — PACK D&C LNX SURGICOUNT

## (undated) DEVICE — POSITIONER FOAM EGG CRATE ULNAR 2PCS (PINK)

## (undated) DEVICE — VACUUM CURETTE BERKLEY OLYMPUS CURVED 9MM

## (undated) DEVICE — DRSG COMBINE 5X9"

## (undated) DEVICE — VACUUM CURETTE BUSSE HOSP CURVED 12MM

## (undated) DEVICE — VACUUM CURETTE BERKLEY OLYMPUS CURVED 11MM

## (undated) DEVICE — VENODYNE/SCD SLEEVE CALF MEDIUM

## (undated) DEVICE — VACUUM CURETTE BERKLEY OLYMPUS CURVED 8MM